# Patient Record
Sex: FEMALE | Race: WHITE | NOT HISPANIC OR LATINO
[De-identification: names, ages, dates, MRNs, and addresses within clinical notes are randomized per-mention and may not be internally consistent; named-entity substitution may affect disease eponyms.]

---

## 2017-01-16 ENCOUNTER — CHARTING TRANS (OUTPATIENT)
Dept: OTHER | Age: 22
End: 2017-01-16

## 2017-01-16 ENCOUNTER — BH HISTORICAL (OUTPATIENT)
Dept: OTHER | Age: 22
End: 2017-01-16

## 2017-01-23 ENCOUNTER — CHARTING TRANS (OUTPATIENT)
Dept: OTHER | Age: 22
End: 2017-01-23

## 2017-02-01 ENCOUNTER — BH HISTORICAL (OUTPATIENT)
Dept: OTHER | Age: 22
End: 2017-02-01

## 2017-02-06 ENCOUNTER — CHARTING TRANS (OUTPATIENT)
Dept: OTHER | Age: 22
End: 2017-02-06

## 2017-02-06 ENCOUNTER — BH HISTORICAL (OUTPATIENT)
Dept: OTHER | Age: 22
End: 2017-02-06

## 2017-02-06 ENCOUNTER — LAB SERVICES (OUTPATIENT)
Dept: OTHER | Age: 22
End: 2017-02-06

## 2017-02-13 ENCOUNTER — CHARTING TRANS (OUTPATIENT)
Dept: OTHER | Age: 22
End: 2017-02-13

## 2017-02-15 ENCOUNTER — CHARTING TRANS (OUTPATIENT)
Dept: OTHER | Age: 22
End: 2017-02-15

## 2017-02-15 ENCOUNTER — BH HISTORICAL (OUTPATIENT)
Dept: OTHER | Age: 22
End: 2017-02-15

## 2017-02-16 LAB
ALBUMIN: 4.1 GM/DL (ref 3.5–5)
ALKALINE PHOSPHATASE: 49 U/L (ref 38–126)
ALT: 20 U/L (ref 10–52)
AST/SGOT: 17 U/L (ref 14–40)
BILIRUBIN TOTAL: 0.4 MG/DL (ref 0.2–1.3)
BUN: 12 MG/DL (ref 7–17)
CALCIUM: 9.3 MG/DL (ref 8.4–10.2)
CARBON DIOXIDE: 26 MMOL/L (ref 22–30)
CHLORIDE: 105 MMOL/L (ref 98–107)
CREATININE: 0.69 MG/DL (ref 0.52–1.04)
EGFR FOR AFRICAN AMERICANS: > 60
EGFR FOR NON-AFRICAN AMERICANS: > 60
GLUCOSE: 46 MG/DL (ref 70–99)
POTASSIUM: 3.4 MMOL/L (ref 3.5–5)
SODIUM: 140 MMOL/L (ref 136–145)
TOTAL PROTEIN: 7.2 GM/DL (ref 6.3–8.3)

## 2017-03-24 ENCOUNTER — CHARTING TRANS (OUTPATIENT)
Dept: OTHER | Age: 22
End: 2017-03-24

## 2017-04-07 ENCOUNTER — LAB SERVICES (OUTPATIENT)
Dept: OTHER | Age: 22
End: 2017-04-07

## 2017-04-07 ENCOUNTER — CHARTING TRANS (OUTPATIENT)
Dept: OTHER | Age: 22
End: 2017-04-07

## 2017-04-07 LAB
APPEARANCE: NORMAL
BILIRUBIN: NORMAL
COLOR: NORMAL
GLUCOSE U: NORMAL
KETONES: NORMAL
LEUKOCYTES: 500
NITRITE: NORMAL
OCCULT BLOOD: 250
PH: 7
PROTEIN: NORMAL
URINE SPEC GRAVITY: 1.01
UROBILINOGEN: NORMAL

## 2017-04-11 ENCOUNTER — LAB SERVICES (OUTPATIENT)
Dept: OTHER | Age: 22
End: 2017-04-11

## 2017-04-11 ENCOUNTER — CHARTING TRANS (OUTPATIENT)
Dept: OTHER | Age: 22
End: 2017-04-11

## 2017-04-11 LAB
CLUE CELLS: POSITIVE
M TB RRNA SPEC QL PROBE: NEGATIVE
SOURCE KOHP2: NORMAL
TRICHOMONAS W: NEGATIVE
YEAST WM: NEGATIVE

## 2017-04-21 ENCOUNTER — LAB SERVICES (OUTPATIENT)
Dept: OTHER | Age: 22
End: 2017-04-21

## 2017-04-21 ENCOUNTER — CHARTING TRANS (OUTPATIENT)
Dept: OTHER | Age: 22
End: 2017-04-21

## 2017-04-21 LAB
APPEARANCE: CLEAR
BILIRUBIN: NORMAL
COLOR: NORMAL
GLUCOSE U: NORMAL
GLUCOSE: 77
HCG-QUAL URINE PNT RESULT: NEGATIVE
HEMATOCRIT: 36.2 % (ref 37–47)
HEMOGLOBIN: 12.7 GM/DL (ref 12–16)
KETONES: NORMAL
LEUKOCYTES: NORMAL
MEAN CORPUSCULAR HEMOGLOBIN: 31.6 PG/CELL (ref 27–35)
MEAN CORPUSCULAR HGB CONC: 35 G/DL (ref 32–36)
MEAN CORPUSCULAR VOLUME: 90.2 FL (ref 81–102)
MEAN PLATELET VOLUME: 9.7 FL (ref 6.7–10.4)
NITRITE: NORMAL
OCCULT BLOOD: NORMAL
PH: 7
PLATELET COUNT: 169 K/UL (ref 145–375)
PROTEIN: NORMAL
RED CELL COUNT: 4.01 M/UL (ref 3.8–5.1)
RED CELL DISTRIBUTION WIDTH: 10.9 % (ref 11.5–14.5)
THYROID STIMULATING HORMONE: 2.75 MIU/ML (ref 0.47–4.68)
URINE SPEC GRAVITY: 1.01
UROBILINOGEN: NORMAL
WHITE BLOOD COUNT: 4.9 K/UL (ref 4.8–10.8)

## 2017-04-24 ENCOUNTER — CHARTING TRANS (OUTPATIENT)
Dept: OTHER | Age: 22
End: 2017-04-24

## 2017-04-24 LAB — GLUCOSE METER BEDSIDE: 77 MG/DL (ref 70–99)

## 2017-04-26 LAB
CHLAMYDIA TRACHOMATIS NAA^LB: NEGATIVE
NEISSERIA GONORRHOEAE NAA^LB: NEGATIVE
SOURCE: NORMAL

## 2017-05-01 ENCOUNTER — LAB SERVICES (OUTPATIENT)
Dept: OTHER | Age: 22
End: 2017-05-01

## 2017-05-01 ENCOUNTER — CHARTING TRANS (OUTPATIENT)
Dept: OTHER | Age: 22
End: 2017-05-01

## 2017-05-05 LAB
CLINICAL HISTORY: NORMAL
CYTOTECHNOLOGIST: NORMAL
DESCRIPTIVE DIAGNOSIS: NORMAL
LAST PAP DX: NORMAL
LMP: NORMAL
Lab: NORMAL
PREV BX DX: NORMAL
REFLEX HPV NOTE: NORMAL
SOURCE (XYPAPS): NORMAL
STATEMENT OF ADEQUACY: NORMAL

## 2017-06-20 ENCOUNTER — BH HISTORICAL (OUTPATIENT)
Dept: OTHER | Age: 22
End: 2017-06-20

## 2017-06-30 ENCOUNTER — BH HISTORICAL (OUTPATIENT)
Dept: OTHER | Age: 22
End: 2017-06-30

## 2017-07-03 ENCOUNTER — BH HISTORICAL (OUTPATIENT)
Dept: OTHER | Age: 22
End: 2017-07-03

## 2017-08-15 ENCOUNTER — CHARTING TRANS (OUTPATIENT)
Dept: OTHER | Age: 22
End: 2017-08-15

## 2017-08-21 ENCOUNTER — CHARTING TRANS (OUTPATIENT)
Dept: OTHER | Age: 22
End: 2017-08-21

## 2017-08-22 ENCOUNTER — LAB SERVICES (OUTPATIENT)
Dept: OTHER | Age: 22
End: 2017-08-22

## 2017-08-22 LAB
ALBUMIN: 3.9 GM/DL (ref 3.5–5)
ALKALINE PHOSPHATASE: 46 U/L (ref 38–126)
ALT: 37 U/L (ref 10–52)
APPEARANCE, URINE: CLEAR
AST/SGOT: 36 U/L (ref 14–40)
BASO #: 0.03 K/UL (ref 0–0.2)
BASO %: 0 % (ref 0–2)
BILIRUBIN TOTAL: 0.4 MG/DL (ref 0.2–1.3)
BILIRUBIN-URINE: NORMAL
BUN: 10 MG/DL (ref 7–17)
CALCIUM: 9.1 MG/DL (ref 8.4–10.2)
CARBON DIOXIDE: 26 MMOL/L (ref 22–30)
CHLORIDE: 107 MMOL/L (ref 98–107)
COLOR, URINE: YELLOW
CREATININE: 0.75 MG/DL (ref 0.52–1.04)
EGFR FOR AFRICAN AMERICANS: > 60
EGFR FOR NON-AFRICAN AMERICANS: > 60
EOS #: 0.15 K/UL (ref 0–0.5)
EOS %: 1 % (ref 0–5)
GLUCOSE URINE-UA: NORMAL
GLUCOSE: 79 MG/DL (ref 70–99)
HEMATOCRIT: 39.7 % (ref 37–47)
HEMOGLOBIN: 13.2 GM/DL (ref 12–16)
KETONE-URINE: NORMAL
LYMPH #: 2.45 K/UL (ref 1–4.8)
LYMPH %: 24 % (ref 22–44)
MEAN CORPUSCULAR HEMOGLOBIN: 31.8 PG/CELL (ref 27–35)
MEAN CORPUSCULAR HGB CONC: 33.2 G/DL (ref 32–36)
MEAN CORPUSCULAR VOLUME: 95.8 FL (ref 81–102)
MEAN PLATELET VOLUME: 9.6 FL (ref 6.7–10.4)
MONO #: 0.7 K/UL (ref 0–0.8)
MONO %: 7 % (ref 2–10)
NEUTROPHILS #: 7.09 K/UL (ref 1.8–7.7)
NEUTROPHILS %: 68 % (ref 40–70)
NITRITE-URINE: NORMAL
OCCULT BLOOD URINE: NORMAL
PH-URINE: 6 (ref 5–8)
PLATELET COUNT: 160 K/UL (ref 145–375)
POTASSIUM: 3.9 MMOL/L (ref 3.5–5)
PROTEIN-URINE-DIP: NORMAL
RED CELL COUNT: 4.15 M/UL (ref 3.8–5.1)
RED CELL DISTRIBUTION WIDTH: 11.5 % (ref 11.5–14.5)
SODIUM: 143 MMOL/L (ref 136–145)
SPECIFIC GRAVITY-URINE: 1.02 (ref 1.01–1.03)
TOTAL PROTEIN: 6.6 GM/DL (ref 6.3–8.3)
URINE LEUKOCYTE ESTERASE: NORMAL
UROBILINOGEN-URINE: NORMAL MG/DL (ref 0.2–1)
WHITE BLOOD COUNT: 10.4 K/UL (ref 4.8–10.8)

## 2017-08-28 ENCOUNTER — LAB SERVICES (OUTPATIENT)
Dept: OTHER | Age: 22
End: 2017-08-28

## 2017-08-28 ENCOUNTER — CHARTING TRANS (OUTPATIENT)
Dept: OTHER | Age: 22
End: 2017-08-28

## 2017-08-28 LAB
APPEARANCE, URINE: CLEAR
APPEARANCE: CLEAR
BILIRUBIN-URINE: NORMAL
BILIRUBIN: NORMAL
COLOR, URINE: YELLOW
COLOR: NORMAL
GLUCOSE U: NORMAL
GLUCOSE URINE-UA: NORMAL
KETONE-URINE: NORMAL
KETONES: NORMAL
LEUKOCYTES: 75
NITRITE-URINE: NORMAL
NITRITE: NORMAL
OCCULT BLOOD URINE: NORMAL
OCCULT BLOOD: NORMAL
PH-URINE: 6.5 (ref 5–8)
PH: 6
PROTEIN-URINE-DIP: NORMAL
PROTEIN: NORMAL
SPECIFIC GRAVITY-URINE: 1.02 (ref 1.01–1.03)
URINE LEUKOCYTE ESTERASE: NORMAL
URINE SPEC GRAVITY: 1.02
UROBILINOGEN-URINE: NORMAL MG/DL (ref 0.2–1)
UROBILINOGEN: NORMAL

## 2017-08-30 LAB — URINE CULTURE: NORMAL

## 2017-09-12 ENCOUNTER — CHARTING TRANS (OUTPATIENT)
Dept: OTHER | Age: 22
End: 2017-09-12

## 2017-09-12 ENCOUNTER — LAB SERVICES (OUTPATIENT)
Dept: OTHER | Age: 22
End: 2017-09-12

## 2017-09-12 LAB
GLUCOSE METER BEDSIDE: 88 MG/DL (ref 70–99)
GLUCOSE-BEDSIDE: 88

## 2017-11-03 ENCOUNTER — CHARTING TRANS (OUTPATIENT)
Dept: OTHER | Age: 22
End: 2017-11-03

## 2017-11-06 ENCOUNTER — CHARTING TRANS (OUTPATIENT)
Dept: OTHER | Age: 22
End: 2017-11-06

## 2018-03-13 ENCOUNTER — CHARTING TRANS (OUTPATIENT)
Dept: OTHER | Age: 23
End: 2018-03-13

## 2018-05-18 ENCOUNTER — LAB SERVICES (OUTPATIENT)
Dept: OTHER | Age: 23
End: 2018-05-18

## 2018-05-18 LAB
GLUCOSE 1 HR PP 50G DOSE: 122 MG/DL
HEMATOCRIT: 36.2 % (ref 37–47)
HEMOGLOBIN: 12.4 GM/DL (ref 12–16)
HIV 1 & 2 AB SERPL IA: NONREACTIVE
HIV1 P24 AG SERPL IA-ACNC: NONREACTIVE
MEAN CORPUSCULAR HEMOGLOBIN: 32.5 PG/CELL (ref 27–35)
MEAN CORPUSCULAR HGB CONC: 34.4 G/DL (ref 32–36)
MEAN CORPUSCULAR VOLUME: 94.4 FL (ref 81–102)
MEAN PLATELET VOLUME: 9.6 FL (ref 6.7–10.4)
PLATELET COUNT: 191 K/UL (ref 145–375)
RED CELL COUNT: 3.83 M/UL (ref 3.8–5.1)
RED CELL DISTRIBUTION WIDTH: 11.6 % (ref 11.5–14.5)
WHITE BLOOD COUNT: 6.9 K/UL (ref 4.8–10.8)

## 2018-06-18 ENCOUNTER — CHARTING TRANS (OUTPATIENT)
Dept: OTHER | Age: 23
End: 2018-06-18

## 2018-09-13 ENCOUNTER — CHARTING TRANS (OUTPATIENT)
Dept: OTHER | Age: 23
End: 2018-09-13

## 2018-10-19 ENCOUNTER — CHARTING TRANS (OUTPATIENT)
Dept: OTHER | Age: 23
End: 2018-10-19

## 2018-11-01 VITALS
DIASTOLIC BLOOD PRESSURE: 64 MMHG | HEART RATE: 126 BPM | RESPIRATION RATE: 16 BRPM | SYSTOLIC BLOOD PRESSURE: 100 MMHG | BODY MASS INDEX: 21.6 KG/M2 | OXYGEN SATURATION: 99 % | WEIGHT: 117.38 LBS | TEMPERATURE: 97.9 F | HEIGHT: 62 IN

## 2018-11-01 VITALS
HEART RATE: 88 BPM | DIASTOLIC BLOOD PRESSURE: 70 MMHG | SYSTOLIC BLOOD PRESSURE: 103 MMHG | HEIGHT: 62 IN | OXYGEN SATURATION: 99 %

## 2018-11-02 VITALS
HEART RATE: 88 BPM | DIASTOLIC BLOOD PRESSURE: 78 MMHG | WEIGHT: 95 LBS | RESPIRATION RATE: 16 BRPM | SYSTOLIC BLOOD PRESSURE: 108 MMHG | TEMPERATURE: 97 F

## 2018-11-02 VITALS
DIASTOLIC BLOOD PRESSURE: 68 MMHG | HEART RATE: 68 BPM | TEMPERATURE: 97.5 F | HEIGHT: 62 IN | SYSTOLIC BLOOD PRESSURE: 92 MMHG | BODY MASS INDEX: 18.09 KG/M2 | OXYGEN SATURATION: 100 % | WEIGHT: 98.33 LBS

## 2018-11-03 VITALS
HEIGHT: 62 IN | WEIGHT: 94.13 LBS | TEMPERATURE: 98.5 F | OXYGEN SATURATION: 100 % | HEART RATE: 91 BPM | DIASTOLIC BLOOD PRESSURE: 72 MMHG | RESPIRATION RATE: 16 BRPM | BODY MASS INDEX: 17.32 KG/M2 | SYSTOLIC BLOOD PRESSURE: 120 MMHG

## 2018-11-03 VITALS
HEIGHT: 62 IN | OXYGEN SATURATION: 99 % | HEART RATE: 93 BPM | WEIGHT: 96.38 LBS | SYSTOLIC BLOOD PRESSURE: 110 MMHG | RESPIRATION RATE: 16 BRPM | DIASTOLIC BLOOD PRESSURE: 72 MMHG | BODY MASS INDEX: 17.74 KG/M2 | TEMPERATURE: 97.6 F

## 2018-11-03 VITALS
SYSTOLIC BLOOD PRESSURE: 106 MMHG | HEIGHT: 62 IN | WEIGHT: 92.25 LBS | DIASTOLIC BLOOD PRESSURE: 72 MMHG | BODY MASS INDEX: 16.97 KG/M2 | TEMPERATURE: 97.5 F | HEART RATE: 74 BPM | RESPIRATION RATE: 16 BRPM | OXYGEN SATURATION: 100 %

## 2018-11-03 VITALS
DIASTOLIC BLOOD PRESSURE: 80 MMHG | SYSTOLIC BLOOD PRESSURE: 118 MMHG | HEART RATE: 84 BPM | HEIGHT: 62 IN | TEMPERATURE: 97.2 F | BODY MASS INDEX: 17.3 KG/M2 | WEIGHT: 94 LBS | RESPIRATION RATE: 16 BRPM

## 2018-11-04 VITALS
HEIGHT: 62 IN | BODY MASS INDEX: 17.71 KG/M2 | DIASTOLIC BLOOD PRESSURE: 62 MMHG | OXYGEN SATURATION: 100 % | RESPIRATION RATE: 16 BRPM | SYSTOLIC BLOOD PRESSURE: 104 MMHG | TEMPERATURE: 98.4 F | HEART RATE: 65 BPM | WEIGHT: 96.25 LBS

## 2018-11-05 VITALS
HEART RATE: 80 BPM | DIASTOLIC BLOOD PRESSURE: 70 MMHG | WEIGHT: 96.67 LBS | SYSTOLIC BLOOD PRESSURE: 100 MMHG | HEIGHT: 62 IN | BODY MASS INDEX: 17.79 KG/M2

## 2018-11-05 VITALS
TEMPERATURE: 97.9 F | HEIGHT: 62 IN | BODY MASS INDEX: 17.76 KG/M2 | WEIGHT: 96.49 LBS | SYSTOLIC BLOOD PRESSURE: 102 MMHG | RESPIRATION RATE: 16 BRPM | HEART RATE: 64 BPM | DIASTOLIC BLOOD PRESSURE: 62 MMHG

## 2018-11-05 VITALS
TEMPERATURE: 97.2 F | SYSTOLIC BLOOD PRESSURE: 100 MMHG | BODY MASS INDEX: 17.73 KG/M2 | HEIGHT: 62 IN | HEART RATE: 60 BPM | RESPIRATION RATE: 16 BRPM | WEIGHT: 96.36 LBS | DIASTOLIC BLOOD PRESSURE: 70 MMHG

## 2018-11-05 VITALS
DIASTOLIC BLOOD PRESSURE: 62 MMHG | BODY MASS INDEX: 17.85 KG/M2 | HEART RATE: 80 BPM | WEIGHT: 96.98 LBS | HEIGHT: 62 IN | RESPIRATION RATE: 16 BRPM | TEMPERATURE: 98.5 F | SYSTOLIC BLOOD PRESSURE: 104 MMHG

## 2018-11-05 VITALS
WEIGHT: 95.24 LBS | BODY MASS INDEX: 17.53 KG/M2 | HEIGHT: 62 IN | SYSTOLIC BLOOD PRESSURE: 100 MMHG | DIASTOLIC BLOOD PRESSURE: 70 MMHG | TEMPERATURE: 98.3 F | HEART RATE: 88 BPM | RESPIRATION RATE: 16 BRPM

## 2018-11-05 VITALS
WEIGHT: 98.06 LBS | HEIGHT: 62 IN | BODY MASS INDEX: 18.05 KG/M2 | SYSTOLIC BLOOD PRESSURE: 100 MMHG | RESPIRATION RATE: 16 BRPM | TEMPERATURE: 98.1 F | DIASTOLIC BLOOD PRESSURE: 68 MMHG | HEART RATE: 76 BPM

## 2018-11-05 VITALS
DIASTOLIC BLOOD PRESSURE: 60 MMHG | TEMPERATURE: 97.6 F | HEIGHT: 62 IN | RESPIRATION RATE: 16 BRPM | WEIGHT: 95.5 LBS | HEART RATE: 78 BPM | OXYGEN SATURATION: 99 % | BODY MASS INDEX: 17.57 KG/M2 | SYSTOLIC BLOOD PRESSURE: 102 MMHG

## 2018-11-05 VITALS — DIASTOLIC BLOOD PRESSURE: 62 MMHG | HEART RATE: 80 BPM | WEIGHT: 97.13 LBS | SYSTOLIC BLOOD PRESSURE: 112 MMHG

## 2018-11-06 VITALS
DIASTOLIC BLOOD PRESSURE: 74 MMHG | BODY MASS INDEX: 17.87 KG/M2 | HEIGHT: 62 IN | HEART RATE: 84 BPM | WEIGHT: 97.11 LBS | SYSTOLIC BLOOD PRESSURE: 100 MMHG

## 2018-11-06 VITALS
DIASTOLIC BLOOD PRESSURE: 62 MMHG | TEMPERATURE: 98.2 F | HEART RATE: 84 BPM | SYSTOLIC BLOOD PRESSURE: 102 MMHG | HEIGHT: 62 IN | BODY MASS INDEX: 17.14 KG/M2 | RESPIRATION RATE: 16 BRPM | WEIGHT: 93.13 LBS

## 2018-11-12 ENCOUNTER — CHARTING TRANS (OUTPATIENT)
Dept: OTHER | Age: 23
End: 2018-11-12

## 2018-11-27 VITALS
OXYGEN SATURATION: 98 % | HEIGHT: 62 IN | BODY MASS INDEX: 19.69 KG/M2 | HEART RATE: 106 BPM | TEMPERATURE: 99.7 F | WEIGHT: 107 LBS

## 2018-11-27 VITALS
DIASTOLIC BLOOD PRESSURE: 69 MMHG | SYSTOLIC BLOOD PRESSURE: 104 MMHG | OXYGEN SATURATION: 98 % | TEMPERATURE: 97.9 F | HEART RATE: 91 BPM | WEIGHT: 107 LBS | BODY MASS INDEX: 19.69 KG/M2 | HEIGHT: 62 IN

## 2018-12-07 VITALS — TEMPERATURE: 98.4 F

## 2018-12-18 PROBLEM — R63.6 UNDERWEIGHT: Status: ACTIVE | Noted: 2017-09-12

## 2018-12-18 PROBLEM — F98.8 ADD (ATTENTION DEFICIT DISORDER): Status: ACTIVE | Noted: 2017-01-16

## 2018-12-18 PROBLEM — F41.1 GAD (GENERALIZED ANXIETY DISORDER): Status: ACTIVE | Noted: 2017-02-15

## 2018-12-18 PROBLEM — Z30.09 FAMILY PLANNING: Status: ACTIVE | Noted: 2017-02-13

## 2018-12-18 RX ORDER — NORETHINDRONE ACETATE AND ETHINYL ESTRADIOL 1; .02 MG/1; MG/1
1 TABLET ORAL DAILY
COMMUNITY
Start: 2018-10-18

## 2018-12-18 RX ORDER — LORATADINE 10 MG/1
1 CAPSULE, LIQUID FILLED ORAL DAILY
COMMUNITY
Start: 2018-08-07 | End: 2019-08-07

## 2018-12-19 RX ORDER — ALBUTEROL SULFATE 90 UG/1
AEROSOL, METERED RESPIRATORY (INHALATION)
COMMUNITY

## 2018-12-21 ENCOUNTER — APPOINTMENT (OUTPATIENT)
Dept: FAMILY MEDICINE | Facility: CLINIC | Age: 23
End: 2018-12-21

## 2019-01-11 ENCOUNTER — OFFICE VISIT (OUTPATIENT)
Dept: FAMILY MEDICINE | Facility: CLINIC | Age: 24
End: 2019-01-11

## 2019-01-11 VITALS
RESPIRATION RATE: 16 BRPM | WEIGHT: 92.8 LBS | BODY MASS INDEX: 16.97 KG/M2 | OXYGEN SATURATION: 99 % | DIASTOLIC BLOOD PRESSURE: 58 MMHG | TEMPERATURE: 97.6 F | HEART RATE: 72 BPM | SYSTOLIC BLOOD PRESSURE: 102 MMHG

## 2019-01-11 DIAGNOSIS — Z53.21 PATIENT LEFT WITHOUT BEING SEEN: Primary | ICD-10-CM

## 2019-01-11 SDOH — HEALTH STABILITY: MENTAL HEALTH: HOW OFTEN DO YOU HAVE A DRINK CONTAINING ALCOHOL?: NEVER

## 2019-01-11 ASSESSMENT — PATIENT HEALTH QUESTIONNAIRE - PHQ9
SUM OF ALL RESPONSES TO PHQ9 QUESTIONS 1 AND 2: 0
1. LITTLE INTEREST OR PLEASURE IN DOING THINGS: NOT AT ALL
2. FEELING DOWN, DEPRESSED OR HOPELESS: NOT AT ALL

## 2019-02-01 PROBLEM — Z53.21 PATIENT LEFT WITHOUT BEING SEEN: Status: ACTIVE | Noted: 2019-02-01

## 2022-07-26 ENCOUNTER — OFFICE VISIT (OUTPATIENT)
Dept: DERMATOLOGY | Facility: CLINIC | Age: 27
End: 2022-07-26
Payer: COMMERCIAL

## 2022-07-26 DIAGNOSIS — L66.1 LICHEN PLANOPILARIS: Primary | ICD-10-CM

## 2022-07-26 PROCEDURE — 1159F PR MEDICATION LIST DOCUMENTED IN MEDICAL RECORD: ICD-10-PCS | Mod: CPTII,S$GLB,, | Performed by: STUDENT IN AN ORGANIZED HEALTH CARE EDUCATION/TRAINING PROGRAM

## 2022-07-26 PROCEDURE — 99204 OFFICE O/P NEW MOD 45 MIN: CPT | Mod: 25,S$GLB,, | Performed by: STUDENT IN AN ORGANIZED HEALTH CARE EDUCATION/TRAINING PROGRAM

## 2022-07-26 PROCEDURE — 99999 PR PBB SHADOW E&M-NEW PATIENT-LVL II: ICD-10-PCS | Mod: PBBFAC,,, | Performed by: STUDENT IN AN ORGANIZED HEALTH CARE EDUCATION/TRAINING PROGRAM

## 2022-07-26 PROCEDURE — 1160F RVW MEDS BY RX/DR IN RCRD: CPT | Mod: CPTII,S$GLB,, | Performed by: STUDENT IN AN ORGANIZED HEALTH CARE EDUCATION/TRAINING PROGRAM

## 2022-07-26 PROCEDURE — 11900 PR INJECTION INTO SKIN LESIONS, UP TO 7: ICD-10-PCS | Mod: S$GLB,,, | Performed by: STUDENT IN AN ORGANIZED HEALTH CARE EDUCATION/TRAINING PROGRAM

## 2022-07-26 PROCEDURE — 99204 PR OFFICE/OUTPT VISIT, NEW, LEVL IV, 45-59 MIN: ICD-10-PCS | Mod: 25,S$GLB,, | Performed by: STUDENT IN AN ORGANIZED HEALTH CARE EDUCATION/TRAINING PROGRAM

## 2022-07-26 PROCEDURE — 99999 PR PBB SHADOW E&M-NEW PATIENT-LVL II: CPT | Mod: PBBFAC,,, | Performed by: STUDENT IN AN ORGANIZED HEALTH CARE EDUCATION/TRAINING PROGRAM

## 2022-07-26 PROCEDURE — 11900 INJECT SKIN LESIONS </W 7: CPT | Mod: S$GLB,,, | Performed by: STUDENT IN AN ORGANIZED HEALTH CARE EDUCATION/TRAINING PROGRAM

## 2022-07-26 PROCEDURE — 1159F MED LIST DOCD IN RCRD: CPT | Mod: CPTII,S$GLB,, | Performed by: STUDENT IN AN ORGANIZED HEALTH CARE EDUCATION/TRAINING PROGRAM

## 2022-07-26 PROCEDURE — 1160F PR REVIEW ALL MEDS BY PRESCRIBER/CLIN PHARMACIST DOCUMENTED: ICD-10-PCS | Mod: CPTII,S$GLB,, | Performed by: STUDENT IN AN ORGANIZED HEALTH CARE EDUCATION/TRAINING PROGRAM

## 2022-07-26 RX ORDER — CLOBETASOL PROPIONATE 0.46 MG/ML
SOLUTION TOPICAL 2 TIMES DAILY
COMMUNITY
End: 2022-07-26 | Stop reason: SDUPTHER

## 2022-07-26 RX ORDER — CLOBETASOL PROPIONATE 0.46 MG/ML
SOLUTION TOPICAL 2 TIMES DAILY
Qty: 50 ML | Refills: 3 | Status: SHIPPED | OUTPATIENT
Start: 2022-07-26

## 2022-07-26 RX ORDER — HYDROXYCHLOROQUINE SULFATE 200 MG/1
200 TABLET, FILM COATED ORAL 2 TIMES DAILY
Qty: 60 TABLET | Refills: 2 | Status: SHIPPED | OUTPATIENT
Start: 2022-07-26

## 2022-07-26 NOTE — PROGRESS NOTES
Subjective:       Patient ID:  Dilcia Nick is a 27 y.o. female who presents for   Chief Complaint   Patient presents with    Hair Loss     Has been seen by a dermatologist in the past. Biopsy has been done. Pt has had injections and used creams in the past. Pt reports the treatments did help. Pt would like to discuss plan for the long term.      History of Present Illness: The patient presents with chief complaint of hair loss, recently had a biopsy done by outside dermatologist consistent with lichen planopilaris. She was started on topical clobetasol and had the scalp injected with ILK several months ago. Reports from using those 2 medications, she has noticed some hair regrowth along the very frontal scalp. Hair loss had been progressing for years along the entire frontal, parietal and temporal areas even some along the back of the scalp. Here to discuss treatment options and establish care.         Review of Systems   Constitutional: Negative for fever and chills.   Skin: Negative for itching, rash and dry skin.        Objective:    Physical Exam   Constitutional: She appears well-developed and well-nourished. No distress.   Neurological: She is alert and oriented to person, place, and time. She is not disoriented.   Psychiatric: She has a normal mood and affect.   Skin:   Areas Examined (abnormalities noted in diagram):   Scalp / Hair Palpated and Inspected  Head / Face Inspection Performed  Neck Inspection Performed              Diagram Legend     Erythematous scaling macule/papule c/w actinic keratosis       Vascular papule c/w angioma      Pigmented verrucoid papule/plaque c/w seborrheic keratosis      Yellow umbilicated papule c/w sebaceous hyperplasia      Irregularly shaped tan macule c/w lentigo     1-2 mm smooth white papules consistent with Milia      Movable subcutaneous cyst with punctum c/w epidermal inclusion cyst      Subcutaneous movable cyst c/w pilar cyst      Firm pink to brown papule  c/w dermatofibroma      Pedunculated fleshy papule(s) c/w skin tag(s)      Evenly pigmented macule c/w junctional nevus     Mildly variegated pigmented, slightly irregular-bordered macule c/w mildly atypical nevus      Flesh colored to evenly pigmented papule c/w intradermal nevus       Pink pearly papule/plaque c/w basal cell carcinoma      Erythematous hyperkeratotic cursted plaque c/w SCC      Surgical scar with no sign of skin cancer recurrence      Open and closed comedones      Inflammatory papules and pustules      Verrucoid papule consistent consistent with wart     Erythematous eczematous patches and plaques     Dystrophic onycholytic nail with subungual debris c/w onychomycosis     Umbilicated papule    Erythematous-base heme-crusted tan verrucoid plaque consistent with inflamed seborrheic keratosis     Erythematous Silvery Scaling Plaque c/w Psoriasis     See annotation      Assessment / Plan:        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 Discussed treatment options with patient, including continuing with topicals, ILK, as well as adding Plaquenil to regimen. Injection #1 today.    -     hydrOXYchloroQUINE (PLAQUENIL) 200 mg tablet; Take 1 tablet (200 mg total) by mouth 2 (two) times daily.  Dispense: 60 tablet; Refill: 2  -     triamcinolone acetonide injection 10 mg  -     clobetasoL (TEMOVATE) 0.05 % external solution; Apply topically 2 (two) times daily.  Dispense: 50 mL; Refill: 3    Intralesional Kenalog 5mg/cc (5 cc total) injected into <7 lesions on the scalp today after obtaining verbal consent including risk of surrounding hypopigmentation. Patient tolerated procedure well.      NDC for Kenalog 10mg/cc:  7028-5754-63           Follow up in about 6 weeks (around 9/6/2022).

## 2022-08-09 ENCOUNTER — PATIENT MESSAGE (OUTPATIENT)
Dept: ADMINISTRATIVE | Facility: OTHER | Age: 27
End: 2022-08-09
Payer: COMMERCIAL

## 2022-09-06 ENCOUNTER — OFFICE VISIT (OUTPATIENT)
Dept: DERMATOLOGY | Facility: CLINIC | Age: 27
End: 2022-09-06
Payer: COMMERCIAL

## 2022-09-06 DIAGNOSIS — L66.1 LICHEN PLANOPILARIS: Primary | ICD-10-CM

## 2022-09-06 PROCEDURE — 1160F RVW MEDS BY RX/DR IN RCRD: CPT | Mod: CPTII,S$GLB,, | Performed by: STUDENT IN AN ORGANIZED HEALTH CARE EDUCATION/TRAINING PROGRAM

## 2022-09-06 PROCEDURE — 1159F MED LIST DOCD IN RCRD: CPT | Mod: CPTII,S$GLB,, | Performed by: STUDENT IN AN ORGANIZED HEALTH CARE EDUCATION/TRAINING PROGRAM

## 2022-09-06 PROCEDURE — 99999 PR PBB SHADOW E&M-EST. PATIENT-LVL III: CPT | Mod: PBBFAC,,, | Performed by: STUDENT IN AN ORGANIZED HEALTH CARE EDUCATION/TRAINING PROGRAM

## 2022-09-06 PROCEDURE — 11900 PR INJECTION INTO SKIN LESIONS, UP TO 7: ICD-10-PCS | Mod: S$GLB,,, | Performed by: STUDENT IN AN ORGANIZED HEALTH CARE EDUCATION/TRAINING PROGRAM

## 2022-09-06 PROCEDURE — 1160F PR REVIEW ALL MEDS BY PRESCRIBER/CLIN PHARMACIST DOCUMENTED: ICD-10-PCS | Mod: CPTII,S$GLB,, | Performed by: STUDENT IN AN ORGANIZED HEALTH CARE EDUCATION/TRAINING PROGRAM

## 2022-09-06 PROCEDURE — 99999 PR PBB SHADOW E&M-EST. PATIENT-LVL III: ICD-10-PCS | Mod: PBBFAC,,, | Performed by: STUDENT IN AN ORGANIZED HEALTH CARE EDUCATION/TRAINING PROGRAM

## 2022-09-06 PROCEDURE — 99499 NO LOS: ICD-10-PCS | Mod: S$GLB,,, | Performed by: STUDENT IN AN ORGANIZED HEALTH CARE EDUCATION/TRAINING PROGRAM

## 2022-09-06 PROCEDURE — 11900 INJECT SKIN LESIONS </W 7: CPT | Mod: S$GLB,,, | Performed by: STUDENT IN AN ORGANIZED HEALTH CARE EDUCATION/TRAINING PROGRAM

## 2022-09-06 PROCEDURE — 1159F PR MEDICATION LIST DOCUMENTED IN MEDICAL RECORD: ICD-10-PCS | Mod: CPTII,S$GLB,, | Performed by: STUDENT IN AN ORGANIZED HEALTH CARE EDUCATION/TRAINING PROGRAM

## 2022-09-06 PROCEDURE — 99499 UNLISTED E&M SERVICE: CPT | Mod: S$GLB,,, | Performed by: STUDENT IN AN ORGANIZED HEALTH CARE EDUCATION/TRAINING PROGRAM

## 2022-09-06 NOTE — PROGRESS NOTES
Subjective:       Patient ID:  Dilcia Nick is a 27 y.o. female who presents for   Chief Complaint   Patient presents with    Hair Loss     History of Present Illness: The patient presents for follow up of lichen planopilaris of the scalp. Last seen on 7/26/22 where she was started on Plaquenil, topical clobetasol, and given ILK injections into the frontal, temporal and back of the scalp. Reports much improvement in symptoms with noticeable hair regrowth along the scalp that has been persistent. No further loss. Tolerating medications well. Would like injections again today.       Hair Loss      Review of Systems   Constitutional:  Negative for fever and chills.   Skin:  Negative for itching, rash and dry skin.      Objective:    Physical Exam   Constitutional: She appears well-developed and well-nourished. No distress.   Neurological: She is alert and oriented to person, place, and time. She is not disoriented.   Psychiatric: She has a normal mood and affect.   Skin:   Areas Examined (abnormalities noted in diagram):   Scalp / Hair Palpated and Inspected  Head / Face Inspection Performed  Neck Inspection Performed            Diagram Legend     Erythematous scaling macule/papule c/w actinic keratosis       Vascular papule c/w angioma      Pigmented verrucoid papule/plaque c/w seborrheic keratosis      Yellow umbilicated papule c/w sebaceous hyperplasia      Irregularly shaped tan macule c/w lentigo     1-2 mm smooth white papules consistent with Milia      Movable subcutaneous cyst with punctum c/w epidermal inclusion cyst      Subcutaneous movable cyst c/w pilar cyst      Firm pink to brown papule c/w dermatofibroma      Pedunculated fleshy papule(s) c/w skin tag(s)      Evenly pigmented macule c/w junctional nevus     Mildly variegated pigmented, slightly irregular-bordered macule c/w mildly atypical nevus      Flesh colored to evenly pigmented papule c/w intradermal nevus       Pink pearly papule/plaque  c/w basal cell carcinoma      Erythematous hyperkeratotic cursted plaque c/w SCC      Surgical scar with no sign of skin cancer recurrence      Open and closed comedones      Inflammatory papules and pustules      Verrucoid papule consistent consistent with wart     Erythematous eczematous patches and plaques     Dystrophic onycholytic nail with subungual debris c/w onychomycosis     Umbilicated papule    Erythematous-base heme-crusted tan verrucoid plaque consistent with inflamed seborrheic keratosis     Erythematous Silvery Scaling Plaque c/w Psoriasis     See annotation      Assessment / Plan:        Lichen planopilaris - much improvement; patient with noticeable hair regrowth on the frontal, temporal and occipital scalp regions. Injection #2 today.   -     triamcinolone acetonide injection 10 mg  -     Continue hydroxychloroquine and topical clobetasol.     Intralesional Kenalog 5mg/cc (5 cc total) injected into <7 lesions on the scalp today after obtaining verbal consent including risk of surrounding hypopigmentation. Patient tolerated procedure well.      NDC for Kenalog 10mg/cc:  4636-3344-75           Follow up in about 8 weeks (around 11/1/2022).

## 2022-12-05 ENCOUNTER — OFFICE VISIT (OUTPATIENT)
Dept: FAMILY MEDICINE | Facility: CLINIC | Age: 27
End: 2022-12-05
Payer: COMMERCIAL

## 2022-12-05 VITALS
SYSTOLIC BLOOD PRESSURE: 102 MMHG | BODY MASS INDEX: 32.99 KG/M2 | HEART RATE: 72 BPM | HEIGHT: 65 IN | OXYGEN SATURATION: 99 % | WEIGHT: 198 LBS | DIASTOLIC BLOOD PRESSURE: 60 MMHG

## 2022-12-05 DIAGNOSIS — L66.1 LICHEN PLANOPILARIS: ICD-10-CM

## 2022-12-05 DIAGNOSIS — N64.4 BREAST PAIN, LEFT: Primary | ICD-10-CM

## 2022-12-05 DIAGNOSIS — N63.24 MASS OF LOWER INNER QUADRANT OF LEFT BREAST: ICD-10-CM

## 2022-12-05 PROBLEM — B00.9 HSV INFECTION: Status: ACTIVE | Noted: 2022-12-05

## 2022-12-05 PROCEDURE — 3074F PR MOST RECENT SYSTOLIC BLOOD PRESSURE < 130 MM HG: ICD-10-PCS | Mod: CPTII,S$GLB,, | Performed by: FAMILY MEDICINE

## 2022-12-05 PROCEDURE — 1159F MED LIST DOCD IN RCRD: CPT | Mod: CPTII,S$GLB,, | Performed by: FAMILY MEDICINE

## 2022-12-05 PROCEDURE — 3008F BODY MASS INDEX DOCD: CPT | Mod: CPTII,S$GLB,, | Performed by: FAMILY MEDICINE

## 2022-12-05 PROCEDURE — 99203 PR OFFICE/OUTPT VISIT, NEW, LEVL III, 30-44 MIN: ICD-10-PCS | Mod: S$GLB,,, | Performed by: FAMILY MEDICINE

## 2022-12-05 PROCEDURE — 3078F DIAST BP <80 MM HG: CPT | Mod: CPTII,S$GLB,, | Performed by: FAMILY MEDICINE

## 2022-12-05 PROCEDURE — 99203 OFFICE O/P NEW LOW 30 MIN: CPT | Mod: S$GLB,,, | Performed by: FAMILY MEDICINE

## 2022-12-05 PROCEDURE — 3074F SYST BP LT 130 MM HG: CPT | Mod: CPTII,S$GLB,, | Performed by: FAMILY MEDICINE

## 2022-12-05 PROCEDURE — 1160F RVW MEDS BY RX/DR IN RCRD: CPT | Mod: CPTII,S$GLB,, | Performed by: FAMILY MEDICINE

## 2022-12-05 PROCEDURE — 1160F PR REVIEW ALL MEDS BY PRESCRIBER/CLIN PHARMACIST DOCUMENTED: ICD-10-PCS | Mod: CPTII,S$GLB,, | Performed by: FAMILY MEDICINE

## 2022-12-05 PROCEDURE — 3008F PR BODY MASS INDEX (BMI) DOCUMENTED: ICD-10-PCS | Mod: CPTII,S$GLB,, | Performed by: FAMILY MEDICINE

## 2022-12-05 PROCEDURE — 3078F PR MOST RECENT DIASTOLIC BLOOD PRESSURE < 80 MM HG: ICD-10-PCS | Mod: CPTII,S$GLB,, | Performed by: FAMILY MEDICINE

## 2022-12-05 PROCEDURE — 1159F PR MEDICATION LIST DOCUMENTED IN MEDICAL RECORD: ICD-10-PCS | Mod: CPTII,S$GLB,, | Performed by: FAMILY MEDICINE

## 2022-12-05 RX ORDER — VALACYCLOVIR HYDROCHLORIDE 500 MG/1
TABLET, FILM COATED ORAL
COMMUNITY

## 2022-12-05 NOTE — PROGRESS NOTES
SUBJECTIVE:    Patient ID: Dilcia Nick is a 27 y.o. female.    Chief Complaint: Establish Care (Brought bottles, Flu declined// SW)    Patient presents with complaints of a 2 month history of increased left breast pain.  She always notices some tenderness in both breasts around the time of her menstrual cycle but has been more prominent recently.  She did self-breast exam and noted a painful tender nodule in the lower inner quadrant of her breast.  At times she has pain radiating to her nipple.  She denies any redness or nipple discharge.  Denies any breast trauma.  Denies family history of breast cancer    She is currently seeing Dermatology and receiving Plaquenil for lichen planopilaris.  She is also getting steroid treatments.  She feels that they are helping.        Past Medical History:   Diagnosis Date    Irregular menses     Lichen planopilaris 12/5/2022    Rectal bleeding      Past Surgical History:   Procedure Laterality Date    TYMPANOSTOMY TUBE PLACEMENT       Family History   Problem Relation Age of Onset    Hypertension Unknown     Glaucoma Unknown        Marital Status: Single  Alcohol History:  reports no history of alcohol use.  Tobacco History:  reports that she has quit smoking. She does not have any smokeless tobacco history on file.  Drug History:  reports no history of drug use.    Review of patient's allergies indicates:   Allergen Reactions    Kiwi      Other reaction(s): Hives,itching         Current Outpatient Medications:     clobetasoL (TEMOVATE) 0.05 % external solution, Apply topically 2 (two) times daily., Disp: 50 mL, Rfl: 3    hydrOXYchloroQUINE (PLAQUENIL) 200 mg tablet, Take 1 tablet (200 mg total) by mouth 2 (two) times daily., Disp: 60 tablet, Rfl: 2    valACYclovir (VALTREX) 500 MG tablet, , Disp: , Rfl:     Current Facility-Administered Medications:     triamcinolone acetonide injection 10 mg, 10 mg, Intradermal, 1 time in Clinic/HOD, Nito Viveros MD     "triamcinolone acetonide injection 10 mg, 10 mg, Intradermal, 1 time in Clinic/HOD, Nito Viveros MD    Review of Systems   Constitutional:  Negative for activity change, fatigue and unexpected weight change.   HENT:  Negative for hearing loss, postnasal drip, sinus pressure, sore throat and voice change.    Eyes:  Negative for photophobia and visual disturbance.   Respiratory:  Negative for cough, shortness of breath and wheezing.    Cardiovascular:  Negative for chest pain and palpitations.   Gastrointestinal:  Negative for constipation, diarrhea and nausea.   Genitourinary:  Negative for difficulty urinating, frequency, hematuria and urgency.   Musculoskeletal:  Negative for arthralgias and back pain.   Skin:  Negative for rash.   Neurological:  Negative for weakness, light-headedness and headaches.   Hematological:  Negative for adenopathy. Does not bruise/bleed easily.   Psychiatric/Behavioral:  The patient is not nervous/anxious.         Objective:      Vitals:    12/05/22 1601   BP: 102/60   Pulse: 72   SpO2: 99%   Weight: 89.8 kg (198 lb)   Height: 5' 4.5" (1.638 m)     Physical Exam  Constitutional:       Appearance: Normal appearance.   HENT:      Head: Normocephalic and atraumatic.      Mouth/Throat:      Mouth: Mucous membranes are moist.   Eyes:      Conjunctiva/sclera: Conjunctivae normal.   Cardiovascular:      Rate and Rhythm: Normal rate and regular rhythm.   Pulmonary:      Effort: Pulmonary effort is normal.   Chest:   Breasts:     Vadim Score is 5.      Breasts are symmetrical.      Right: No inverted nipple or nipple discharge.      Left: Mass present. No inverted nipple or nipple discharge.      Comments: Grape sized noted in lower inner quadrant of the left breast  Neurological:      General: No focal deficit present.      Mental Status: She is alert and oriented to person, place, and time.   Psychiatric:         Mood and Affect: Mood normal.         Behavior: Behavior normal.     "     Assessment:       1. Breast pain, left    2. Mass of lower inner quadrant of left breast    3. Lichen planopilaris           Plan:       Breast pain, left  -     Mammo Digital Diagnostic Bilat with [facility]; Future; Expected date: 12/05/2022    Mass of lower inner quadrant of left breast  -     Mammo Digital Diagnostic Bilat with [facility]; Future; Expected date: 12/05/2022    Lichen planopilaris    Follow up in about 6 months (around 6/5/2023) for Annual Physical.

## 2022-12-06 DIAGNOSIS — N64.4 BREAST PAIN, LEFT: Primary | ICD-10-CM

## 2022-12-06 DIAGNOSIS — N63.24 MASS OF LOWER INNER QUADRANT OF LEFT BREAST: ICD-10-CM

## 2022-12-13 ENCOUNTER — OFFICE VISIT (OUTPATIENT)
Dept: DERMATOLOGY | Facility: CLINIC | Age: 27
End: 2022-12-13
Payer: COMMERCIAL

## 2022-12-13 ENCOUNTER — TELEPHONE (OUTPATIENT)
Dept: DERMATOLOGY | Facility: CLINIC | Age: 27
End: 2022-12-13
Payer: COMMERCIAL

## 2022-12-13 DIAGNOSIS — L30.9 DERMATITIS: Primary | ICD-10-CM

## 2022-12-13 DIAGNOSIS — L66.1 LICHEN PLANOPILARIS: ICD-10-CM

## 2022-12-13 PROCEDURE — 11900 INJECT SKIN LESIONS </W 7: CPT | Mod: S$GLB,,, | Performed by: STUDENT IN AN ORGANIZED HEALTH CARE EDUCATION/TRAINING PROGRAM

## 2022-12-13 PROCEDURE — 11900 PR INJECTION INTO SKIN LESIONS, UP TO 7: ICD-10-PCS | Mod: S$GLB,,, | Performed by: STUDENT IN AN ORGANIZED HEALTH CARE EDUCATION/TRAINING PROGRAM

## 2022-12-13 PROCEDURE — 1160F PR REVIEW ALL MEDS BY PRESCRIBER/CLIN PHARMACIST DOCUMENTED: ICD-10-PCS | Mod: CPTII,S$GLB,, | Performed by: STUDENT IN AN ORGANIZED HEALTH CARE EDUCATION/TRAINING PROGRAM

## 2022-12-13 PROCEDURE — 99213 OFFICE O/P EST LOW 20 MIN: CPT | Mod: 25,S$GLB,, | Performed by: STUDENT IN AN ORGANIZED HEALTH CARE EDUCATION/TRAINING PROGRAM

## 2022-12-13 PROCEDURE — 1159F MED LIST DOCD IN RCRD: CPT | Mod: CPTII,S$GLB,, | Performed by: STUDENT IN AN ORGANIZED HEALTH CARE EDUCATION/TRAINING PROGRAM

## 2022-12-13 PROCEDURE — 1159F PR MEDICATION LIST DOCUMENTED IN MEDICAL RECORD: ICD-10-PCS | Mod: CPTII,S$GLB,, | Performed by: STUDENT IN AN ORGANIZED HEALTH CARE EDUCATION/TRAINING PROGRAM

## 2022-12-13 PROCEDURE — 1160F RVW MEDS BY RX/DR IN RCRD: CPT | Mod: CPTII,S$GLB,, | Performed by: STUDENT IN AN ORGANIZED HEALTH CARE EDUCATION/TRAINING PROGRAM

## 2022-12-13 PROCEDURE — 99213 PR OFFICE/OUTPT VISIT, EST, LEVL III, 20-29 MIN: ICD-10-PCS | Mod: 25,S$GLB,, | Performed by: STUDENT IN AN ORGANIZED HEALTH CARE EDUCATION/TRAINING PROGRAM

## 2022-12-13 PROCEDURE — 99999 PR PBB SHADOW E&M-EST. PATIENT-LVL III: CPT | Mod: PBBFAC,,, | Performed by: STUDENT IN AN ORGANIZED HEALTH CARE EDUCATION/TRAINING PROGRAM

## 2022-12-13 PROCEDURE — 99999 PR PBB SHADOW E&M-EST. PATIENT-LVL III: ICD-10-PCS | Mod: PBBFAC,,, | Performed by: STUDENT IN AN ORGANIZED HEALTH CARE EDUCATION/TRAINING PROGRAM

## 2022-12-13 RX ORDER — CLOBETASOL PROPIONATE 0.46 MG/ML
SOLUTION TOPICAL DAILY
Qty: 50 ML | Refills: 3 | Status: SHIPPED | OUTPATIENT
Start: 2022-12-13

## 2022-12-13 RX ORDER — TRIAMCINOLONE ACETONIDE 0.25 MG/G
CREAM TOPICAL
Qty: 80 G | Refills: 1 | Status: SHIPPED | OUTPATIENT
Start: 2022-12-13

## 2022-12-13 NOTE — PROGRESS NOTES
Subjective:       Patient ID:  Dilcia Nick is a 27 y.o. female who presents for   Chief Complaint   Patient presents with    Hair Loss     Scalp injections. Patient has noticed improvement.      History of Present Illness: The patient presents for follow up of lichen planopilaris of the scalp. Last seen on 9/6/22, currently on Plaquenil, topical clobetasol, and ILK injections into the frontal, temporal and back of the scalp. Continues to have much improvement in symptoms with noticeable hair regrowth along the scalp that has been persistent. No further loss noted.    Patient did recently change her diet away from her previous Pescatarian diet. After changing diet, noticed increased itching of the scalp and rash on the face.     Hair Loss      Review of Systems   Constitutional:  Negative for fever and chills.   Skin:  Positive for rash (Patient with recent rash on the cheeks, reports developing over the past several weeks to months after changing diet. Red, itchy bumps on the cheeks.).      Objective:    Physical Exam   Constitutional: She appears well-developed and well-nourished. No distress.   Neurological: She is alert and oriented to person, place, and time. She is not disoriented.   Psychiatric: She has a normal mood and affect.   Skin:   Areas Examined (abnormalities noted in diagram):   Scalp / Hair Palpated and Inspected  Head / Face Inspection Performed  Neck Inspection Performed            Diagram Legend     Erythematous scaling macule/papule c/w actinic keratosis       Vascular papule c/w angioma      Pigmented verrucoid papule/plaque c/w seborrheic keratosis      Yellow umbilicated papule c/w sebaceous hyperplasia      Irregularly shaped tan macule c/w lentigo     1-2 mm smooth white papules consistent with Milia      Movable subcutaneous cyst with punctum c/w epidermal inclusion cyst      Subcutaneous movable cyst c/w pilar cyst      Firm pink to brown papule c/w dermatofibroma       Pedunculated fleshy papule(s) c/w skin tag(s)      Evenly pigmented macule c/w junctional nevus     Mildly variegated pigmented, slightly irregular-bordered macule c/w mildly atypical nevus      Flesh colored to evenly pigmented papule c/w intradermal nevus       Pink pearly papule/plaque c/w basal cell carcinoma      Erythematous hyperkeratotic cursted plaque c/w SCC      Surgical scar with no sign of skin cancer recurrence      Open and closed comedones      Inflammatory papules and pustules      Verrucoid papule consistent consistent with wart     Erythematous eczematous patches and plaques     Dystrophic onycholytic nail with subungual debris c/w onychomycosis     Umbilicated papule    Erythematous-base heme-crusted tan verrucoid plaque consistent with inflamed seborrheic keratosis     Erythematous Silvery Scaling Plaque c/w Psoriasis     See annotation      Assessment / Plan:        Dermatitis - rash on the face.   -     triamcinolone acetonide 0.025% (KENALOG) 0.025 % cream; AAA bid  Dispense: 80 g; Refill: 1    Lichen planopilaris - improving with treatment of Plaquenil, ILK injections and topical steroids. Will continue at this time. Repeat ILK injections today.   -     clobetasoL (TEMOVATE) 0.05 % external solution; Apply topically once daily. Apply to the scalp  Dispense: 50 mL; Refill: 3  -     triamcinolone acetonide injection 10 mg    Intralesional Kenalog 5mg/cc (5 cc total) injected into <7 lesions on the scalp today after obtaining verbal consent including risk of surrounding hypopigmentation. Patient tolerated procedure well.    NDC for Kenalog 10mg/cc:  9882-7561-49           Follow up in about 8 weeks (around 2/7/2023).

## 2022-12-13 NOTE — TELEPHONE ENCOUNTER
Called and spoke with patient. Informed them that they can check in early to their appointment. Patient verbally understood.

## 2022-12-13 NOTE — TELEPHONE ENCOUNTER
----- Message from Sarah Tipton sent at 12/13/2022  1:30 PM CST -----  Pt is in town and would like to know can she be seen early. Call back number is [phone]. Thx.EL

## 2022-12-16 ENCOUNTER — HOSPITAL ENCOUNTER (OUTPATIENT)
Dept: RADIOLOGY | Facility: HOSPITAL | Age: 27
Discharge: HOME | End: 2022-12-16
Attending: FAMILY MEDICINE
Payer: COMMERCIAL

## 2022-12-16 DIAGNOSIS — N64.4 BREAST PAIN, LEFT: ICD-10-CM

## 2022-12-16 DIAGNOSIS — N63.24 MASS OF LOWER INNER QUADRANT OF LEFT BREAST: ICD-10-CM

## 2022-12-16 PROCEDURE — 76642 ULTRASOUND BREAST LIMITED: CPT | Mod: TC,PO,LT

## 2023-04-11 ENCOUNTER — PATIENT MESSAGE (OUTPATIENT)
Dept: ADMINISTRATIVE | Facility: HOSPITAL | Age: 28
End: 2023-04-11
Payer: COMMERCIAL

## 2023-05-25 ENCOUNTER — TELEPHONE (OUTPATIENT)
Dept: FAMILY MEDICINE | Facility: CLINIC | Age: 28
End: 2023-05-25

## 2023-05-25 DIAGNOSIS — Z79.899 ENCOUNTER FOR LONG-TERM (CURRENT) USE OF OTHER MEDICATIONS: Primary | ICD-10-CM

## 2023-05-25 NOTE — TELEPHONE ENCOUNTER
LMOR of fasting lab work and urine prior to upcoming appt. Reviewed record, patient has cancelled appt. Requested for patient to call and set another appt and to get lab work done 1 week prior to set appt. Call if has questions.     Order pended to Dr. Nick-  Lipid CBC CMP UA TSH

## 2023-05-25 NOTE — TELEPHONE ENCOUNTER
----- Message from Devin Nick MD sent at 12/5/2022  4:57 PM CST -----  Remind patient to get labs prior to upcoming appointment   Put in annual exam labs

## 2023-06-13 NOTE — TELEPHONE ENCOUNTER
See other encounter. Patient states. Patient will not be returning for appts. No other action required

## 2023-06-27 ENCOUNTER — PATIENT MESSAGE (OUTPATIENT)
Dept: RESEARCH | Facility: HOSPITAL | Age: 28
End: 2023-06-27

## 2024-07-09 ENCOUNTER — PATIENT MESSAGE (OUTPATIENT)
Dept: ADMINISTRATIVE | Facility: HOSPITAL | Age: 29
End: 2024-07-09